# Patient Record
Sex: MALE | Race: WHITE | NOT HISPANIC OR LATINO | Employment: FULL TIME | URBAN - METROPOLITAN AREA
[De-identification: names, ages, dates, MRNs, and addresses within clinical notes are randomized per-mention and may not be internally consistent; named-entity substitution may affect disease eponyms.]

---

## 2023-01-19 ENCOUNTER — OFFICE VISIT (OUTPATIENT)
Dept: MEDICAL GROUP | Facility: PHYSICIAN GROUP | Age: 28
End: 2023-01-19
Payer: COMMERCIAL

## 2023-01-19 VITALS
OXYGEN SATURATION: 96 % | HEIGHT: 68 IN | BODY MASS INDEX: 32.16 KG/M2 | TEMPERATURE: 99.4 F | SYSTOLIC BLOOD PRESSURE: 122 MMHG | DIASTOLIC BLOOD PRESSURE: 90 MMHG | WEIGHT: 212.2 LBS | HEART RATE: 116 BPM

## 2023-01-19 DIAGNOSIS — F90.9 ATTENTION DEFICIT HYPERACTIVITY DISORDER (ADHD), UNSPECIFIED ADHD TYPE: ICD-10-CM

## 2023-01-19 DIAGNOSIS — H93.8X2 EAR FULLNESS, LEFT: ICD-10-CM

## 2023-01-19 DIAGNOSIS — Z23 NEED FOR VACCINATION: ICD-10-CM

## 2023-01-19 DIAGNOSIS — H61.22 IMPACTED CERUMEN OF LEFT EAR: ICD-10-CM

## 2023-01-19 DIAGNOSIS — F64.0 GENDER DYSPHORIA IN ADULT: ICD-10-CM

## 2023-01-19 DIAGNOSIS — R22.1 LUMP ON NECK: ICD-10-CM

## 2023-01-19 DIAGNOSIS — Z00.00 ENCOUNTER FOR MEDICAL EXAMINATION TO ESTABLISH CARE: ICD-10-CM

## 2023-01-19 PROBLEM — D17.0 LIPOMA OF NECK: Status: ACTIVE | Noted: 2023-01-19

## 2023-01-19 PROBLEM — F90.2 ATTENTION DEFICIT HYPERACTIVITY DISORDER (ADHD), COMBINED TYPE: Status: ACTIVE | Noted: 2023-01-19

## 2023-01-19 PROCEDURE — 90472 IMMUNIZATION ADMIN EACH ADD: CPT

## 2023-01-19 PROCEDURE — 90715 TDAP VACCINE 7 YRS/> IM: CPT

## 2023-01-19 PROCEDURE — 99205 OFFICE O/P NEW HI 60 MIN: CPT | Mod: 25

## 2023-01-19 PROCEDURE — 90686 IIV4 VACC NO PRSV 0.5 ML IM: CPT

## 2023-01-19 PROCEDURE — 90471 IMMUNIZATION ADMIN: CPT

## 2023-01-19 ASSESSMENT — PATIENT HEALTH QUESTIONNAIRE - PHQ9: CLINICAL INTERPRETATION OF PHQ2 SCORE: 0

## 2023-01-20 NOTE — PROGRESS NOTES
Subjective:     CC:    Chief Complaint   Patient presents with    Establish Care       HISTORY OF THE PRESENT ILLNESS: Chivo is a pleasant 27 y.o. male here today to establish care and discuss referral for management of ADHD.  He moved to [place] from [place] about 1.5 years ago and has not yet established with a provider.  Patient states overall, he wants to start getting healthy, including eating better and exercising, which prompted today's visit.    Problem   Attention Deficit Hyperactivity Disorder (Adhd), Combined Type    Diagnosed in 2nd grade  Energy swings   He was on medication for a short time but has been off of it most of his life.  He can't recall why he stopped taking medication.   Mom recently diagnosed as an adult and started medication and feels it has helped her be more productive in developing better habits.  He would like to start exercising, form healthy habits, etc. He has a hard time getting started.      Bmi 32.0-32.9,Adult    He admits he does not live a healthy lifestyle.  He does not exercise but would like to start.  He orders out most of the time.  Lack of appetite and orders food to fill the hunger but doesn't necessarily enjoy food.  He wants to start cooking but has lack of motivation.   He feels it's a lot of effort and time for one meal for just himself.   He doesn't like leftovers and doesn't have the motivation to warm up food.     Lump On Neck    Large, soft lump on back of neck.  No pain or discomfort. Been there for years.        Gender Dysphoria in Adult    Patient did not plan on discussing this today, however, he states would like to explore the idea of gender transition.  He was born male. He currently identifies as a male but states growing up, he has always felt more as if he was a female.  Patient states actually moving forward with transition gives him anxiety.  He states overall, he is happy being a male, but he thinks he would be happier and more fulfilled as a female.   "He states in real life, being referred to as female feels awkward, however, he does play video games and takes on the role of female virtually, which feels right to him.  When people refer to him as \"she\" while anthony, he likes the way it feels and states it resonates more with who he is.    Patient has talked openly with close friends about his gender dysphoria and thoughts of transitioning.  He has told his mom, however, he has not talked to his dad about it as his dad is much more conservative.  Both his parents are still .  He is not yet ready to see an endocrinologist, however, he is interested in talking through his thoughts and feelings with a psychologist.     Ear Fullness, Left    He has muffled hearing that comes and goes.  No pain, itching or tinnitus           Health Maintenance: Completed    ROS:   All systems negative except as addressed in assessment and plan.       Objective:     Exam: BP (!) 122/90 (BP Location: Right arm, Patient Position: Sitting, BP Cuff Size: Adult)   Pulse (!) 116   Temp 37.4 °C (99.4 °F) (Temporal)   Ht 1.727 m (5' 8\")   Wt 96.3 kg (212 lb 3.2 oz)   SpO2 96%  Body mass index is 32.26 kg/m².    Physical Exam  Constitutional:       Appearance: Normal appearance.   HENT:      Right Ear: Tympanic membrane normal.      Left Ear: Tympanic membrane normal. There is impacted cerumen.   Cardiovascular:      Rate and Rhythm: Normal rate.   Pulmonary:      Effort: Pulmonary effort is normal.      Breath sounds: Normal breath sounds.   Abdominal:      General: Bowel sounds are normal.      Palpations: Abdomen is soft.   Musculoskeletal:         General: Normal range of motion.   Neurological:      Mental Status: He is alert. Mental status is at baseline.   Psychiatric:         Mood and Affect: Mood normal.         Behavior: Behavior normal.       Labs: No labs on file to review      Assessment & Plan:   27 y.o. male with the following -    1. Encounter for medical examination " to establish care  Health conditions and medications reviewed and updated. All screenings discussed and up-to-date. Health maintenance completed.     2. Attention deficit hyperactivity disorder (ADHD), unspecified ADHD type  - Referral for Psychological Testing  - Referral to Psychiatry    3. BMI 32.0-32.9,adult  Body mass index is 32.26 kg/m².  Discussed meal prepping, exercising, healthier habits. Patient feels he has issues focusing, paying attention and staying motivated.  He feels he needs to get his ADHD under control to better support his goals of healthcare eating habits.  Referral placed to psychiatry.    4. Gender dysphoria in adult  Provided emotional support to patient regarding this topic.  Discussed referral to psychology to help talk through his feelings and confusion on this topic.  Patient is very open to moving forward with this.  - Referral to Psychology    5. Lump on neck  - US-SOFT TISSUES OF HEAD - NECK; Future    6. Ear fullness, left  7. Impacted cerumen of left ear  - Ear Cerumen Removal  Procedure: Cerumen Removal  Risks and benefits of procedure discussed with patient.  Cerumen removed with  lavage   Patient tolerated the procedure well  Pt educated about proper care of ear canal. Q-tip cleaning discouraged, use of debrox and warm water lavage discussed.  Post procedure exam with clear canal and normal TM.    8. Need for vaccination  - Tdap Vaccine =>8YO IM  - INFLUENZA VACCINE QUAD INJ (PF)    Patient was educated in proper administration of medication(s) ordered today including safety, possible SE, risks, benefits, rationale and alternatives to therapy.   Supportive care, differential diagnoses, and indications for immediate follow-up discussed with patient.    Pathogenesis of diagnosis discussed including typical length and natural progression.    Instructed to return to clinic or nearest emergency department for any change in condition, further concerns, or worsening of  symptoms.  Patient states understanding of the plan of care and discharge instructions.    Return in about 1 year (around 1/19/2024) for Wellness Visit, Lab Review.    I spent a total of 63 minutes with record review, exam, and communication with the patient, communication with other providers, and documentation of this encounter. This does not include time spent on separately billable procedures/tests.    I have placed the above orders and discussed them with an approved delegating provider.  The MA is performing the below orders under the direction of Dr. Vicente.    Please note that this dictation was created using voice recognition software. I have worked with consultants from the vendor as well as technical experts from [facility] to optimize the interface. I have made every reasonable attempt to correct obvious errors, but I expect that there are errors of grammar and possibly content that I did not discover before finalizing the note.

## 2023-02-15 DIAGNOSIS — F64.0 GENDER DYSPHORIA IN ADULT: ICD-10-CM

## 2023-02-15 DIAGNOSIS — F90.2 ATTENTION DEFICIT HYPERACTIVITY DISORDER (ADHD), COMBINED TYPE: ICD-10-CM

## 2023-04-07 ENCOUNTER — OFFICE VISIT (OUTPATIENT)
Dept: MEDICAL GROUP | Facility: PHYSICIAN GROUP | Age: 28
End: 2023-04-07
Payer: COMMERCIAL

## 2023-04-07 VITALS
HEIGHT: 68 IN | OXYGEN SATURATION: 97 % | BODY MASS INDEX: 32.31 KG/M2 | DIASTOLIC BLOOD PRESSURE: 88 MMHG | SYSTOLIC BLOOD PRESSURE: 122 MMHG | TEMPERATURE: 98.9 F | WEIGHT: 213.2 LBS | HEART RATE: 94 BPM

## 2023-04-07 DIAGNOSIS — F32.A ANXIETY AND DEPRESSION: ICD-10-CM

## 2023-04-07 DIAGNOSIS — F41.9 ANXIETY AND DEPRESSION: ICD-10-CM

## 2023-04-07 PROCEDURE — 99214 OFFICE O/P EST MOD 30 MIN: CPT

## 2023-04-07 RX ORDER — CITALOPRAM 20 MG/1
20 TABLET ORAL DAILY
Qty: 90 TABLET | Refills: 0 | Status: SHIPPED | OUTPATIENT
Start: 2023-04-07 | End: 2023-06-30

## 2023-04-07 ASSESSMENT — PATIENT HEALTH QUESTIONNAIRE - PHQ9
SUM OF ALL RESPONSES TO PHQ QUESTIONS 1-9: 9
5. POOR APPETITE OR OVEREATING: 0 - NOT AT ALL
CLINICAL INTERPRETATION OF PHQ2 SCORE: 2

## 2023-04-07 NOTE — PROGRESS NOTES
"Subjective:     CC:   Chief Complaint   Patient presents with    Depression     Pt requesting meds      HISTORY OF THE PRESENT ILLNESS: Chivo is a pleasant 27 y.o. male here today for the following:    He is seeing a psychologist, originally to discuss gender dysphoria, however, during these appointments it has been obvious that he has anxiety and depression. His psychologist thinks these issues should be addressed first. He is here today to discuss medication.    Patient states he is fidgety and restless but primarily he has difficulty finding motivation.  As far as his appetite, patient states he has always had an issue eating enough food.  He finally feels he has a normal healthy appetite, however, he states he does eat only when he is hungry, not to enjoy food.  Therefore, he does not think a medication that could suppress his appetite would be a good idea.    ROS:  All systems negative expect as addressed in assessment and plan.     Objective:     Exam:  /88 (BP Location: Left arm, Patient Position: Sitting, BP Cuff Size: Adult)   Pulse 94   Temp 37.2 °C (98.9 °F) (Temporal)   Ht 1.727 m (5' 8\")   Wt 96.7 kg (213 lb 3.2 oz)   SpO2 97%   BMI 32.42 kg/m²  Body mass index is 32.42 kg/m².    Physical Exam  Constitutional:       Appearance: Normal appearance.   Cardiovascular:      Rate and Rhythm: Normal rate.   Pulmonary:      Effort: Pulmonary effort is normal.   Musculoskeletal:         General: Normal range of motion.   Neurological:      Mental Status: He is alert. Mental status is at baseline.   Psychiatric:         Mood and Affect: Mood normal.         Behavior: Behavior normal.     Assessment & Plan:     27 y.o. male with the following -    1. Anxiety and depression  This is a new problem.  PHQ-9 score is 9-mild depression.  I discussed the benefits and side effects of all SSRIs.  Due to symptoms of agitation and lower appetite, I will try the patient on citalopram for its calming effects and " slight impact on increased appetite it could have.  Patient states his therapist recommended Wellbutrin, however, I do not feel this is a good medication for him given his appetite symptoms.  - citalopram (CELEXA) 20 MG Tab; Take 1 Tablet by mouth every day.  Dispense: 90 Tablet; Refill: 0    Patient was educated in proper administration of medication(s) ordered today including safety, possible SE, risks, benefits, rationale and alternatives to therapy.   Supportive care, differential diagnoses, and indications for immediate follow-up discussed with patient.    Pathogenesis of diagnosis discussed including typical length and natural progression.    Instructed to return to clinic or nearest emergency department for any change in condition, further concerns, or worsening of symptoms.  Patient states understanding of the plan of care and discharge instructions.    Return in 6 weeks (on 5/19/2023) for Medication follow-up.    I spent a total of 30 minutes with record review, exam, and communication with the patient, communication with other providers, and documentation of this encounter. This does not include time spent on separately billable procedures/tests.    I have placed the above orders and discussed them with an approved delegating provider.  The MA is performing the below orders under the direction of Dr. Vicente.    Please note that this dictation was created using voice recognition software. I have worked with consultants from the vendor as well as technical experts from [employer] to optimize the interface. I have made every reasonable attempt to correct obvious errors, but I expect that there are errors of grammar and possibly content that I did not discover before finalizing the note.

## 2023-04-07 NOTE — ASSESSMENT & PLAN NOTE
This is a new problem.  PHQ-9 score is 9-mild depression.  I discussed the benefits and side effects of all SSRIs.  Due to symptoms of agitation and lower appetite, I will try the patient on citalopram for its calming effects and slight impact on increased appetite it could have.  Patient states his therapist recommended Wellbutrin, however, I do not feel this is a good medication for him given his appetite symptoms.

## 2023-05-19 ENCOUNTER — OFFICE VISIT (OUTPATIENT)
Dept: MEDICAL GROUP | Facility: PHYSICIAN GROUP | Age: 28
End: 2023-05-19
Payer: COMMERCIAL

## 2023-05-19 VITALS
HEART RATE: 93 BPM | BODY MASS INDEX: 33.19 KG/M2 | HEIGHT: 68 IN | TEMPERATURE: 98.8 F | WEIGHT: 219 LBS | DIASTOLIC BLOOD PRESSURE: 70 MMHG | SYSTOLIC BLOOD PRESSURE: 106 MMHG | OXYGEN SATURATION: 95 %

## 2023-05-19 DIAGNOSIS — F41.9 ANXIETY AND DEPRESSION: ICD-10-CM

## 2023-05-19 DIAGNOSIS — F90.2 ATTENTION DEFICIT HYPERACTIVITY DISORDER (ADHD), COMBINED TYPE: ICD-10-CM

## 2023-05-19 DIAGNOSIS — F32.A ANXIETY AND DEPRESSION: ICD-10-CM

## 2023-05-19 PROBLEM — H93.8X2 EAR FULLNESS, LEFT: Status: RESOLVED | Noted: 2023-01-19 | Resolved: 2023-05-19

## 2023-05-19 PROCEDURE — 3078F DIAST BP <80 MM HG: CPT

## 2023-05-19 PROCEDURE — 3074F SYST BP LT 130 MM HG: CPT

## 2023-05-19 PROCEDURE — 99214 OFFICE O/P EST MOD 30 MIN: CPT

## 2023-05-19 RX ORDER — BUPROPION HYDROCHLORIDE 150 MG/1
150 TABLET ORAL EVERY MORNING
Qty: 90 TABLET | Refills: 0 | Status: SHIPPED | OUTPATIENT
Start: 2023-05-19 | End: 2023-06-30 | Stop reason: SDUPTHER

## 2023-05-19 ASSESSMENT — PATIENT HEALTH QUESTIONNAIRE - PHQ9
5. POOR APPETITE OR OVEREATING: 0 - NOT AT ALL
SUM OF ALL RESPONSES TO PHQ QUESTIONS 1-9: 8
CLINICAL INTERPRETATION OF PHQ2 SCORE: 1

## 2023-05-19 NOTE — PROGRESS NOTES
Subjective:     CC:   Chief Complaint   Patient presents with    Follow-Up     Follow up on meds       HISTORY OF THE PRESENT ILLNESS: Chivo is a pleasant 27 y.o. male here today to follow-up on mediation for depression and anxiety.    Problem   Anxiety and Depression    Patient was started on citalopram at her last appointment approximately 6 weeks ago.  He is seeing a psychologist for gender dysphoria, however, psychology would like anxiety and depression addressed first.  His PHQ-9 score was 9.    Patient reports today he does not feel any difference.  There were concerns about his poor appetite when we started medication, which did toward me from Wellbutrin.  However, patient states today he feels he is constantly hungry.  Other symptoms include feeling fidgety and restless, but primarily he reports difficulty finding motivation.       Attention Deficit Hyperactivity Disorder (Adhd), Combined Type    Patient states his psychologist is unsure if he has ADHD and would like him to be treated for anxiety first to see if his symptoms resolve.  Diagnosed in 2nd grade  Energy swings. He was on medication for a short time but has been off of it most of his life.  He can't recall why he stopped taking medication.   Mom recently diagnosed as an adult and started medication and feels it has helped her be more productive in developing better habits.  He would like to start exercising, form healthy habits, etc. He has a hard time getting started.        Bmi 32.0-32.9,Adult (Resolved)    He admits he does not live a healthy lifestyle.  He does not exercise but would like to start.  He orders out most of the time.  Lack of appetite and orders food to fill the hunger but doesn't necessarily enjoy food.  He wants to start cooking but has lack of motivation.   He feels it's a lot of effort and time for one meal for just himself.   He doesn't like leftovers and doesn't have the motivation to warm up food.       Ear Fullness, Left  "(Resolved)    He has muffled hearing that comes and goes.  No pain, itching or tinnitus             Health Maintenance: Completed    ROS:  All systems negative expect as addressed in assessment and plan.     Objective:     Exam:  /70 (BP Location: Left arm, Patient Position: Sitting, BP Cuff Size: Adult)   Pulse 93   Temp 37.1 °C (98.8 °F) (Temporal)   Ht 1.727 m (5' 8\")   Wt 99.3 kg (219 lb)   SpO2 95%   BMI 33.30 kg/m²  Body mass index is 33.3 kg/m².    Physical Exam  Constitutional:       Appearance: Normal appearance.   Cardiovascular:      Rate and Rhythm: Normal rate.   Pulmonary:      Effort: Pulmonary effort is normal.   Neurological:      Mental Status: He is alert. Mental status is at baseline.   Psychiatric:         Mood and Affect: Mood normal.         Behavior: Behavior normal.         Assessment & Plan:     27 y.o. male with the following -    1. Anxiety and depression  Chronic, not controlled.  PHQ-9 score on 04/07/23 - 9 (started on Citalopram)  PHQ-9 score today: 8  -Will add Wellbutrin 150 mg daily  -Continue Citalopram 20 mg daily  At next appointment, consider increasing dose of citalopram if indicated.  - buPROPion (WELLBUTRIN XL) 150 MG XL tablet; Take 1 Tablet by mouth every morning.  Dispense: 90 Tablet; Refill: 0    2. Attention deficit hyperactivity disorder (ADHD), combined type  Chronic, ongoing.  Continue close follow-up with psychology.  We will treat anxiety and depression and assess if ADHD symptoms continue.    Patient was educated in proper administration of medication(s) ordered today including safety, possible SE, risks, benefits, rationale and alternatives to therapy.   Supportive care, differential diagnoses, and indications for immediate follow-up discussed with patient.    Pathogenesis of diagnosis discussed including typical length and natural progression.    Instructed to return to clinic or nearest emergency department for any change in condition, further " concerns, or worsening of symptoms.  Patient states understanding of the plan of care and discharge instructions.    Return in about 6 weeks (around 6/30/2023).    I spent a total of 30 minutes with record review, exam, and communication with the patient, communication with other providers, and documentation of this encounter. This does not include time spent on separately billable procedures/tests.    I have placed the above orders and discussed them with an approved delegating provider.  The MA is performing the below orders under the direction of Dr. Vicente.    Please note that this dictation was created using voice recognition software. I have worked with consultants from the vendor as well as technical experts from [facility] to optimize the interface. I have made every reasonable attempt to correct obvious errors, but I expect that there are errors of grammar and possibly content that I did not discover before finalizing the note.

## 2023-05-19 NOTE — ASSESSMENT & PLAN NOTE
Chronic, not controlled.  PHQ-9 score on 04/07/23 - 9 (started on Citalopram)  PHQ-9 score today: 8  -Will add Wellbutrin 150 mg daily  -Continue Citalopram 20 mg daily  At next appointment, consider increasing dose of citalopram if indicated.

## 2023-06-30 ENCOUNTER — OFFICE VISIT (OUTPATIENT)
Dept: MEDICAL GROUP | Facility: PHYSICIAN GROUP | Age: 28
End: 2023-06-30
Payer: COMMERCIAL

## 2023-06-30 VITALS
WEIGHT: 218 LBS | HEART RATE: 96 BPM | SYSTOLIC BLOOD PRESSURE: 100 MMHG | TEMPERATURE: 98.6 F | OXYGEN SATURATION: 94 % | HEIGHT: 68 IN | BODY MASS INDEX: 33.04 KG/M2 | DIASTOLIC BLOOD PRESSURE: 72 MMHG

## 2023-06-30 DIAGNOSIS — F32.A ANXIETY AND DEPRESSION: ICD-10-CM

## 2023-06-30 DIAGNOSIS — F41.9 ANXIETY AND DEPRESSION: ICD-10-CM

## 2023-06-30 PROCEDURE — 99213 OFFICE O/P EST LOW 20 MIN: CPT

## 2023-06-30 PROCEDURE — 3074F SYST BP LT 130 MM HG: CPT

## 2023-06-30 PROCEDURE — 3078F DIAST BP <80 MM HG: CPT

## 2023-06-30 RX ORDER — BUPROPION HYDROCHLORIDE 150 MG/1
150 TABLET ORAL EVERY MORNING
Qty: 90 TABLET | Refills: 0 | Status: SHIPPED | OUTPATIENT
Start: 2023-06-30 | End: 2023-08-15 | Stop reason: SDUPTHER

## 2023-06-30 RX ORDER — CITALOPRAM 40 MG/1
40 TABLET ORAL DAILY
Qty: 90 TABLET | Refills: 0 | Status: SHIPPED | OUTPATIENT
Start: 2023-06-30 | End: 2023-08-15 | Stop reason: SDUPTHER

## 2023-06-30 ASSESSMENT — PATIENT HEALTH QUESTIONNAIRE - PHQ9
CLINICAL INTERPRETATION OF PHQ2 SCORE: 0
5. POOR APPETITE OR OVEREATING: 2 - MORE THAN HALF THE DAYS

## 2023-06-30 NOTE — ASSESSMENT & PLAN NOTE
"Chronic, ongoing.   PHQ-9 score today - 9 (however, first 2 questions were \"never\" which would qualify as PHQ 9 score - 0).  I do feel patient's depression is improving based on PHQ-9 score.  I believe his other symptoms are unrelated to depression and perhaps related to an ADHD component as well as anxiety.  Patient to continue with his support group.    After discussion with patient, I will increase citalopram to 40 mg.  He will return in 6 weeks for follow-up.       "

## 2023-06-30 NOTE — PROGRESS NOTES
"Subjective:     CC:   Chief Complaint   Patient presents with    Follow-Up     Follow up on meds       HISTORY OF THE PRESENT ILLNESS: Chivo is a pleasant 28 y.o. male here today to follow-up on medication for depression and anxiety.    Problem   Anxiety and Depression    Patient is here following up after being started on Wellbutrin 150mg as an adjunct medication to Citalopram approximately 6 weeks ago (05/19/23).  This was added to his regimen of citalopram 20 mg daily.  HPI:  04/27/23 - Started on Citalopram 20 mg (PHQ-9 score 9)  05/19/23 - Wellbutrin 150 mg added (PHQ-9 score 8 and patent reports no difference in how he feels)    Patient states he has been really tired the last few weeks but he is not sure if it's his lifestyle (I.e. lack of water).  He started going to a trans support group. He has a little more motivation but it's still difficult to get himself to partake in personal care.   Appetite: He is eating less. He eats breakfast and lunch but isn't hungry the rest of the day.   Fidgety/Restless: Several days. May be an ADHD component  Sleep/Fatigue: He sleeps enough but is still very tired and naps every day. Seems to be more tired since starting Wellbutrin  Side effects: He feels possible decreased libido.       ROS:  All systems negative expect as addressed in assessment and plan.     Objective:     Exam:  /72 (BP Location: Left arm, Patient Position: Sitting, BP Cuff Size: Adult)   Pulse 96   Temp 37 °C (98.6 °F) (Temporal)   Ht 1.727 m (5' 8\")   Wt 98.9 kg (218 lb)   SpO2 94%   BMI 33.15 kg/m²  Body mass index is 33.15 kg/m².    Physical Exam  Constitutional:       Appearance: Normal appearance.   Cardiovascular:      Rate and Rhythm: Normal rate.   Pulmonary:      Effort: Pulmonary effort is normal.   Neurological:      Mental Status: He is alert. Mental status is at baseline.   Psychiatric:         Mood and Affect: Mood normal.         Behavior: Behavior normal.         Assessment " "& Plan:     28 y.o. male with the following -    1. Anxiety and depression  Chronic, ongoing.   PHQ-9 score today - 9 (however, first 2 questions were \"never\" which would qualify as PHQ 9 score - 0).  I do feel patient's depression is improving based on PHQ-9 score.  I believe his other symptoms are unrelated to depression and perhaps related to an ADHD component as well as anxiety.  Patient to continue with his support group.    After discussion with patient, I will increase citalopram to 40 mg.  He will return in 6 weeks for follow-up.   - citalopram (CELEXA) 40 MG Tab; Take 1 Tablet by mouth every day.  Dispense: 90 Tablet; Refill: 0  - buPROPion (WELLBUTRIN XL) 150 MG XL tablet; Take 1 Tablet by mouth every morning.  Dispense: 90 Tablet; Refill: 0    Patient was educated in proper administration of medication(s) ordered today including safety, possible SE, risks, benefits, rationale and alternatives to therapy.   Supportive care, differential diagnoses, and indications for immediate follow-up discussed with patient.    Pathogenesis of diagnosis discussed including typical length and natural progression.    Instructed to return to clinic or nearest emergency department for any change in condition, further concerns, or worsening of symptoms.  Patient states understanding of the plan of care and discharge instructions.    Return in about 6 weeks (around 8/11/2023) for Medication follow-up.    I spent a total of 24 minutes with record review, exam, and communication with the patient, communication with other providers, and documentation of this encounter. This does not include time spent on separately billable procedures/tests.    I have placed the above orders and discussed them with an approved delegating provider.  The MA is performing the below orders under the direction of Dr. Vicente.    Please note that this dictation was created using voice recognition software. I have worked with consultants from the vendor " as well as technical experts from [facility] to optimize the interface. I have made every reasonable attempt to correct obvious errors, but I expect that there are errors of grammar and possibly content that I did not discover before finalizing the note.

## 2023-08-15 ENCOUNTER — OFFICE VISIT (OUTPATIENT)
Dept: MEDICAL GROUP | Facility: PHYSICIAN GROUP | Age: 28
End: 2023-08-15
Payer: COMMERCIAL

## 2023-08-15 VITALS
DIASTOLIC BLOOD PRESSURE: 76 MMHG | HEART RATE: 80 BPM | BODY MASS INDEX: 32.74 KG/M2 | HEIGHT: 68 IN | OXYGEN SATURATION: 97 % | TEMPERATURE: 99.2 F | WEIGHT: 216 LBS | SYSTOLIC BLOOD PRESSURE: 108 MMHG

## 2023-08-15 DIAGNOSIS — F41.9 ANXIETY AND DEPRESSION: ICD-10-CM

## 2023-08-15 DIAGNOSIS — F32.A ANXIETY AND DEPRESSION: ICD-10-CM

## 2023-08-15 PROCEDURE — 3078F DIAST BP <80 MM HG: CPT

## 2023-08-15 PROCEDURE — 99214 OFFICE O/P EST MOD 30 MIN: CPT

## 2023-08-15 PROCEDURE — 3074F SYST BP LT 130 MM HG: CPT

## 2023-08-15 RX ORDER — BUPROPION HYDROCHLORIDE 150 MG/1
150 TABLET ORAL EVERY MORNING
Qty: 90 TABLET | Refills: 3 | Status: SHIPPED | OUTPATIENT
Start: 2023-08-15

## 2023-08-15 RX ORDER — CITALOPRAM 40 MG/1
40 TABLET ORAL DAILY
Qty: 90 TABLET | Refills: 3 | Status: SHIPPED | OUTPATIENT
Start: 2023-08-15

## 2023-08-15 RX ORDER — FLUOXETINE HYDROCHLORIDE 20 MG/1
20 CAPSULE ORAL DAILY
Qty: 90 CAPSULE | Refills: 0 | Status: CANCELLED | OUTPATIENT
Start: 2023-08-15

## 2023-08-15 ASSESSMENT — PATIENT HEALTH QUESTIONNAIRE - PHQ9
SUM OF ALL RESPONSES TO PHQ QUESTIONS 1-9: 8
5. POOR APPETITE OR OVEREATING: 1 - SEVERAL DAYS
CLINICAL INTERPRETATION OF PHQ2 SCORE: 2

## 2023-08-15 NOTE — PROGRESS NOTES
"Subjective:     CC:   Chief Complaint   Patient presents with    Follow-Up     Follow up on meds       HISTORY OF THE PRESENT ILLNESS: Chivo is a pleasant 28 y.o. male here today to follow-up on his medication.    Problem   Anxiety and Depression    Patient is here following up after a dose increase of his Citalopram to 40 mg. He continues with Wellbutrin 150 mg as an adjunct.    Patient reports he has been feeling really well and overall, his depression has improved. However, over the two weeks, he has noticed little energy as well as little interest in doing things.  He states ran out of his medication and immediately felt a difference. Once he got it refilled, he did start to feel better. He would like to continue with this current regimen.     Patient also notes that he has made a decision to transition.  He has been going to a gender transition support group and has made a big decision and told his family.  He would like to establish [facility] as they have resources available to help with this decision.     HPI:  04/27/23 - Started on Citalopram 20 mg (PHQ-9 score 9)  05/19/23 - Wellbutrin 150 mg added (PHQ-9 score 8 and patent reports no difference in how he feels)  06/30/23 - Increase Citalopram  To 40 mg (PHQ-9 score 9)         Health Maintenance: Completed    ROS:  All systems negative expect as addressed in assessment and plan.     Objective:     Exam:  /76 (BP Location: Right arm, Patient Position: Sitting, BP Cuff Size: Adult)   Pulse 80   Temp 37.3 °C (99.2 °F) (Temporal)   Ht 1.727 m (5' 8\")   Wt 98 kg (216 lb)   SpO2 97%   BMI 32.84 kg/m²  Body mass index is 32.84 kg/m².    Physical Exam  Constitutional:       Appearance: Normal appearance.   Cardiovascular:      Rate and Rhythm: Normal rate.   Pulmonary:      Effort: Pulmonary effort is normal.   Neurological:      Mental Status: He is alert. Mental status is at baseline.   Psychiatric:         Mood and Affect: Mood normal.         Behavior: " Behavior normal.         Assessment & Plan:     28 y.o. male with the following -    1. Anxiety and depression  Chronic, controlled.  Continue with citalopram 40 mg daily and Wellbutrin 150 mg daily.  Patient given refill so he does not run out as he did notice a difference immediately.  - citalopram (CELEXA) 40 MG Tab; Take 1 Tablet by mouth every day.  Dispense: 90 Tablet; Refill: 3  - buPROPion (WELLBUTRIN XL) 150 MG XL tablet; Take 1 Tablet by mouth every morning.  Dispense: 90 Tablet; Refill: 3    Patient was educated in proper administration of medication(s) ordered today including safety, possible SE, risks, benefits, rationale and alternatives to therapy.   Supportive care, differential diagnoses, and indications for immediate follow-up discussed with patient.    Pathogenesis of diagnosis discussed including typical length and natural progression.    Instructed to return to clinic or nearest emergency department for any change in condition, further concerns, or worsening of symptoms.  Patient states understanding of the plan of care and discharge instructions.    Return if symptoms worsen or fail to improve.    I spent a total of 32 minutes with record review, exam, and communication with the patient, communication with other providers, and documentation of this encounter. This does not include time spent on separately billable procedures/tests.    I have placed the above orders and discussed them with an approved delegating provider.  The MA is performing the below orders under the direction of Dr. Vicente.    Please note that this dictation was created using voice recognition software. I have worked with consultants from the vendor as well as technical experts from [facility] to optimize the interface. I have made every reasonable attempt to correct obvious errors, but I expect that there are errors of grammar and possibly content that I did not discover before finalizing the note.

## 2023-08-15 NOTE — ASSESSMENT & PLAN NOTE
Chronic, controlled.  Continue with citalopram 40 mg daily and Wellbutrin 150 mg daily.  Patient given refill so he does not run out as he did notice a difference immediately.

## 2023-10-13 ENCOUNTER — DOCUMENTATION (OUTPATIENT)
Dept: HEALTH INFORMATION MANAGEMENT | Facility: OTHER | Age: 28
End: 2023-10-13
Payer: COMMERCIAL

## 2023-10-26 ENCOUNTER — HOSPITAL ENCOUNTER (OUTPATIENT)
Dept: RADIOLOGY | Facility: MEDICAL CENTER | Age: 28
End: 2023-10-26
Attending: PHYSICIAN ASSISTANT
Payer: COMMERCIAL

## 2023-10-26 DIAGNOSIS — D17.9 LIPOMA, UNSPECIFIED SITE: ICD-10-CM

## 2023-12-20 ENCOUNTER — HOSPITAL ENCOUNTER (OUTPATIENT)
Facility: MEDICAL CENTER | Age: 28
End: 2023-12-20
Attending: SURGERY
Payer: COMMERCIAL

## 2023-12-20 LAB
AMBIGUOUS DTTM AMBI4: NORMAL
PATHOLOGY CONSULT NOTE: NORMAL

## 2023-12-20 PROCEDURE — 88304 TISSUE EXAM BY PATHOLOGIST: CPT
